# Patient Record
(demographics unavailable — no encounter records)

---

## 2024-12-16 NOTE — CONSULT LETTER
[Dear  ___] : Dear  [unfilled], [Consult Letter:] : I had the pleasure of evaluating your patient, [unfilled]. [Please see my note below.] : Please see my note below. [Consult Closing:] : Thank you very much for allowing me to participate in the care of this patient.  If you have any questions, please do not hesitate to contact me. [Sincerely,] : Sincerely, [DrReji  ___] : Dr. COULTER [DrReji ___] : Dr. COULTER [FreeTextEntry3] : Rich

## 2024-12-16 NOTE — PHYSICAL EXAM
[General Appearance - Alert] : alert [General Appearance - In No Acute Distress] : in no acute distress [General Appearance - Well Nourished] : well nourished [General Appearance - Well Developed] : well developed [Sclera] : the sclera and conjunctiva were normal [Extraocular Movements] : extraocular movements were intact [Neck Appearance] : the appearance of the neck was normal [Respiration, Rhythm And Depth] : normal respiratory rhythm and effort [Exaggerated Use Of Accessory Muscles For Inspiration] : no accessory muscle use [Auscultation Breath Sounds / Voice Sounds] : lungs were clear to auscultation bilaterally [Heart Rate And Rhythm] : heart rate was normal and rhythm regular [Heart Sounds] : normal S1 and S2 [Heart Sounds Gallop] : no gallops [Murmurs] : no murmurs [Heart Sounds Pericardial Friction Rub] : no pericardial rub [Edema] : there was no peripheral edema [Bowel Sounds] : normal bowel sounds [Abdomen Soft] : soft [Abdomen Tenderness] : non-tender [] : no hepato-splenomegaly [Abnormal Walk] : normal gait [Involuntary Movements] : no involuntary movements were seen [Skin Color & Pigmentation] : normal skin color and pigmentation [Skin Turgor] : normal skin turgor [No Focal Deficits] : no focal deficits [Oriented To Time, Place, And Person] : oriented to person, place, and time [Impaired Insight] : insight and judgment were intact [Affect] : the affect was normal [Mood] : the mood was normal [Over the Past 2 Weeks, Have You Felt Down, Depressed, or Hopeless?] : 1.) Over the past 2 weeks, have you felt down, depressed, or hopeless? No [Over the Past 2 Weeks, Have You Felt Little Interest or Pleasure Doing Things?] : 2.) Over the past 2 weeks, have you felt little interest or pleasure doing things? No

## 2024-12-16 NOTE — HISTORY OF PRESENT ILLNESS
[FreeTextEntry1] : Rich Cordero is an 83-year-old male seen in consult for his chronic kidney disease.  He was followed by Dr. Cecil Kennedy in the past.  Mr. Cordero's past medical history is significant for hypertension, hyperlipidemia, GERD, prostate cancer s/p total prostatectomy (2000), acoustic neuroma s/p gamma knife in 2014, and rare urinary tract infections in the remote past.  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 Jonathan Goldberg. He initially saw me for the following BUN/creatinine trend:   40/1.6 (12/04/2018), 30/1.5 (03/30/2019), 39/1.8 (12/12/2019),  Mr. Cordero is here for follow up.  He has been well.  He states his back is better.  He also reports his knee is better.  Mr. Cordero states the exercises have really helped.  Mr. Cordero is a friend of Dr. Parmjit Sinha.

## 2024-12-16 NOTE — REVIEW OF SYSTEMS
[As Noted in HPI] : as noted in HPI [Itching] : itching [Negative] : Heme/Lymph [Hesitancy] : no urinary hesitancy [FreeTextEntry8] : nocturia x 1, sometimes three times.   [FreeTextEntry9] : occasional arthritic pain in the knees, sciatica at times, spinal arthritis and chronic back pain.  Improved.